# Patient Record
Sex: MALE | Race: WHITE | NOT HISPANIC OR LATINO | Employment: PART TIME | URBAN - NONMETROPOLITAN AREA
[De-identification: names, ages, dates, MRNs, and addresses within clinical notes are randomized per-mention and may not be internally consistent; named-entity substitution may affect disease eponyms.]

---

## 2017-01-26 NOTE — TELEPHONE ENCOUNTER
----- Message from Shaunna Christy sent at 1/26/2017 10:53 AM CST -----  PATIENT WOULD LIKE A REFILL OF METOPROLOL TAR 25 MG TO [facility] IN [place]      RX SENT TO DR LLANOS TO REVIEW

## 2017-04-25 ENCOUNTER — OUTSIDE FACILITY SERVICE (OUTPATIENT)
Dept: CARDIOLOGY | Facility: CLINIC | Age: 77
End: 2017-04-25

## 2017-04-27 ENCOUNTER — OFFICE VISIT (OUTPATIENT)
Dept: CARDIOLOGY | Facility: CLINIC | Age: 77
End: 2017-04-27

## 2017-04-27 VITALS
DIASTOLIC BLOOD PRESSURE: 70 MMHG | SYSTOLIC BLOOD PRESSURE: 122 MMHG | WEIGHT: 144 LBS | BODY MASS INDEX: 23.99 KG/M2 | HEART RATE: 46 BPM | OXYGEN SATURATION: 99 % | HEIGHT: 65 IN

## 2017-04-27 DIAGNOSIS — I47.1 PAROXYSMAL SVT (SUPRAVENTRICULAR TACHYCARDIA) (HCC): ICD-10-CM

## 2017-04-27 DIAGNOSIS — E78.5 DYSLIPIDEMIA: ICD-10-CM

## 2017-04-27 DIAGNOSIS — R06.00 DYSPNEA, UNSPECIFIED TYPE: ICD-10-CM

## 2017-04-27 DIAGNOSIS — I25.10 CORONARY ARTERY DISEASE INVOLVING NATIVE CORONARY ARTERY OF NATIVE HEART WITHOUT ANGINA PECTORIS: Primary | ICD-10-CM

## 2017-04-27 DIAGNOSIS — I10 ESSENTIAL HYPERTENSION: ICD-10-CM

## 2017-04-27 PROBLEM — I47.10 PAROXYSMAL SVT (SUPRAVENTRICULAR TACHYCARDIA): Status: ACTIVE | Noted: 2017-04-27

## 2017-04-27 PROBLEM — N40.0 BPH (BENIGN PROSTATIC HYPERPLASIA): Status: ACTIVE | Noted: 2017-04-27

## 2017-04-27 PROCEDURE — 93227 XTRNL ECG REC<48 HR R&I: CPT | Performed by: INTERNAL MEDICINE

## 2017-04-27 PROCEDURE — 99214 OFFICE O/P EST MOD 30 MIN: CPT | Performed by: INTERNAL MEDICINE

## 2017-04-27 RX ORDER — LORAZEPAM 0.5 MG/1
0.5 TABLET ORAL EVERY 8 HOURS PRN
COMMUNITY

## 2017-04-27 RX ORDER — ATORVASTATIN CALCIUM 40 MG/1
40 TABLET, FILM COATED ORAL NIGHTLY
COMMUNITY

## 2017-04-27 RX ORDER — ASPIRIN 81 MG/1
81 TABLET, CHEWABLE ORAL DAILY
COMMUNITY

## 2017-04-27 RX ORDER — NITROGLYCERIN 0.4 MG/1
0.4 TABLET SUBLINGUAL
Qty: 25 TABLET | Refills: 11 | Status: SHIPPED | OUTPATIENT
Start: 2017-04-27 | End: 2021-09-30 | Stop reason: SDUPTHER

## 2017-04-27 RX ORDER — TAMSULOSIN HYDROCHLORIDE 0.4 MG/1
1 CAPSULE ORAL NIGHTLY
COMMUNITY

## 2017-04-27 RX ORDER — NITROGLYCERIN 0.4 MG/1
0.4 TABLET SUBLINGUAL
COMMUNITY
End: 2017-04-27 | Stop reason: SDUPTHER

## 2017-04-27 NOTE — PROGRESS NOTES
Reason for Visit: cardiovascular follow up.    HPI:  Jose Emerson is a 76 y.o. male is here today for follow-up.  He was recently in the emergency room on 04/25/2017 with dyspnea and weakness.  EKG performed demonstrated sinus rhythm.  Troponin was within normal limits.  He has not had any further symptoms since discharge.  He denies any chest pain, syncope, PND, or orthopnea.      Previous Cardiac Testing and Procedures:  - Echo (03/11/2013) EF 65%, grade 1 diastolic dysfunction, mild AI, normal RV size and function    Patient Active Problem List   Diagnosis   • BPH (benign prostatic hyperplasia)   • Coronary artery disease involving native coronary artery of native heart without angina pectoris   • Paroxysmal SVT (supraventricular tachycardia)       Social History   Substance Use Topics   • Smoking status: Former Smoker     Packs/day: 0.25     Types: Cigarettes     Quit date: 4/27/1957   • Smokeless tobacco: Never Used      Comment: smoked 10-15 years, a pack lasted 2 months   • Alcohol use No       Family History   Problem Relation Age of Onset   • Cancer Father    • Heart disease Father        The following portions of the patient's history were reviewed and updated as appropriate: allergies, current medications, past family history, past medical history, past social history, past surgical history and problem list.      Current Outpatient Prescriptions:   •  aspirin 81 MG chewable tablet, Chew 81 mg Daily., Disp: , Rfl:   •  atorvastatin (LIPITOR) 40 MG tablet, Take 40 mg by mouth Every Night., Disp: , Rfl:   •  LORazepam (ATIVAN) 0.5 MG tablet, Take 0.5 mg by mouth Every 8 (Eight) Hours As Needed for Anxiety., Disp: , Rfl:   •  metoprolol tartrate (LOPRESSOR) 25 MG tablet, Take 1 tablet by mouth 2 (Two) Times a Day., Disp: 60 tablet, Rfl: 11  •  nitroglycerin (NITROSTAT) 0.4 MG SL tablet, Place 0.4 mg under the tongue Every 5 (Five) Minutes As Needed for Chest Pain. Take no more than 3 doses in 15  "minutes., Disp: , Rfl:   •  tamsulosin (FLOMAX) 0.4 MG capsule 24 hr capsule, Take 1 capsule by mouth Every Night., Disp: , Rfl:     Review of Systems   Constitution: Positive for malaise/fatigue. Negative for chills, decreased appetite and fever.   HENT: Negative for congestion, headaches and nosebleeds.    Eyes: Negative for blurred vision and double vision.   Cardiovascular: Positive for irregular heartbeat and leg swelling. Negative for chest pain and palpitations.   Respiratory: Positive for shortness of breath. Negative for cough.    Endocrine: Positive for heat intolerance. Negative for cold intolerance.   Hematologic/Lymphatic: Bruises/bleeds easily.   Skin: Negative for dry skin, itching and rash.   Musculoskeletal: Positive for joint pain and muscle cramps. Negative for back pain and neck pain.   Gastrointestinal: Positive for diarrhea. Negative for abdominal pain, constipation, heartburn and melena.   Genitourinary: Negative for dysuria, frequency and hematuria.   Neurological: Positive for dizziness and light-headedness. Negative for numbness.   Psychiatric/Behavioral: Negative for depression. The patient does not have insomnia and is not nervous/anxious.        Objective   /70 (BP Location: Left arm, Patient Position: Sitting, Cuff Size: Large Adult)  Pulse (!) 46  Ht 65\" (165.1 cm)  Wt 144 lb (65.3 kg)  SpO2 99%  BMI 23.96 kg/m2  Physical Exam   Constitutional: He is oriented to person, place, and time. He appears well-developed and well-nourished.   HENT:   Head: Normocephalic and atraumatic.   Cardiovascular: Normal rate, regular rhythm and normal heart sounds.    No murmur heard.  Pulmonary/Chest: Effort normal. He has wheezes.   Musculoskeletal: He exhibits no edema.   Neurological: He is alert and oriented to person, place, and time.   Skin: Skin is warm and dry.   Psychiatric: He has a normal mood and affect.     Procedures      ICD-10-CM ICD-9-CM   1. Coronary artery disease involving " native coronary artery of native heart without angina pectoris I25.10 414.01   2. Paroxysmal SVT (supraventricular tachycardia) I47.1 427.0   3. Essential hypertension I10 401.9         Assessment/Plan:  1. CAD:  Remote history of stent placement, approximately 10+ years ago.  On medical therapy with aspirin, atorvastatin, metoprolol, and SL nitroglycerin.    2. Paroxysmal SVT: Sinus rhythm on recent EKG.  No evidence of recurrence.  Continue low dose metoprolol.      3. Hypertension: Well controlled today.      4. Dyslipidemia:  Managed on atorvastatin.      5. Dyspnea: Unclear etiology of current symptoms.  Negative work up in the ED.  No recurrence of symptoms.  No evidence of cardiac etiology.

## 2017-10-26 ENCOUNTER — OFFICE VISIT (OUTPATIENT)
Dept: CARDIOLOGY | Facility: CLINIC | Age: 77
End: 2017-10-26

## 2017-10-26 VITALS
SYSTOLIC BLOOD PRESSURE: 114 MMHG | DIASTOLIC BLOOD PRESSURE: 60 MMHG | HEART RATE: 50 BPM | WEIGHT: 146 LBS | OXYGEN SATURATION: 98 % | BODY MASS INDEX: 24.32 KG/M2 | HEIGHT: 65 IN

## 2017-10-26 DIAGNOSIS — R00.1 BRADYCARDIA: ICD-10-CM

## 2017-10-26 DIAGNOSIS — I47.1 PAROXYSMAL SVT (SUPRAVENTRICULAR TACHYCARDIA) (HCC): ICD-10-CM

## 2017-10-26 DIAGNOSIS — E78.5 DYSLIPIDEMIA: ICD-10-CM

## 2017-10-26 DIAGNOSIS — I25.10 CORONARY ARTERY DISEASE INVOLVING NATIVE CORONARY ARTERY OF NATIVE HEART WITHOUT ANGINA PECTORIS: Primary | ICD-10-CM

## 2017-10-26 DIAGNOSIS — I10 ESSENTIAL HYPERTENSION: ICD-10-CM

## 2017-10-26 PROCEDURE — 93000 ELECTROCARDIOGRAM COMPLETE: CPT | Performed by: INTERNAL MEDICINE

## 2017-10-26 PROCEDURE — 99214 OFFICE O/P EST MOD 30 MIN: CPT | Performed by: INTERNAL MEDICINE

## 2017-10-26 NOTE — PROGRESS NOTES
Reason for Visit: cardiovascular follow up.    HPI:  Jose Emerson is a 77 y.o. male is here today for follow-up.  He has been doing about the same.  Feeling tired is his main complaint.  Denies any chest pain, PND, orthopnea, or syncope.  Feels dizzy at times.  Will note occasional skipped beats.  Has pain in his hands that improves when he lays on his hands on his side.  He is active working in a lumbar yard.     Previous Cardiac Testing and Procedures:  - Echo (03/11/2013) EF 65%, grade 1 diastolic dysfunction, mild AI, normal RV size and function  - Holter monitor (04/25/2017) rare ventricular ectopic beats, rare atrial ectopic beats with short runs of nonsustained SVT, no shifting pulse, arrhythmias, or events, no symptoms    Patient Active Problem List   Diagnosis   • BPH (benign prostatic hyperplasia)   • Coronary artery disease involving native coronary artery of native heart without angina pectoris   • Paroxysmal SVT (supraventricular tachycardia)   • Essential hypertension   • Dyslipidemia       Social History   Substance Use Topics   • Smoking status: Former Smoker     Packs/day: 0.25     Types: Cigarettes     Quit date: 4/27/1957   • Smokeless tobacco: Never Used      Comment: smoked 10-15 years, a pack lasted 2 months   • Alcohol use No       Family History   Problem Relation Age of Onset   • Cancer Father    • Heart disease Father        The following portions of the patient's history were reviewed and updated as appropriate: allergies, current medications, past family history, past medical history, past social history, past surgical history and problem list.      Current Outpatient Prescriptions:   •  aspirin 81 MG chewable tablet, Chew 81 mg Daily., Disp: , Rfl:   •  atorvastatin (LIPITOR) 40 MG tablet, Take 40 mg by mouth Every Night., Disp: , Rfl:   •  LORazepam (ATIVAN) 0.5 MG tablet, Take 0.5 mg by mouth Every 8 (Eight) Hours As Needed for Anxiety., Disp: , Rfl:   •  nitroglycerin  "(NITROSTAT) 0.4 MG SL tablet, Place 1 tablet under the tongue Every 5 (Five) Minutes As Needed for Chest Pain. Take no more than 3 doses in 15 minutes., Disp: 25 tablet, Rfl: 11  •  tamsulosin (FLOMAX) 0.4 MG capsule 24 hr capsule, Take 1 capsule by mouth Every Night., Disp: , Rfl:     Review of Systems   Constitution: Positive for malaise/fatigue. Negative for chills, decreased appetite and fever.   HENT: Negative for congestion and nosebleeds.    Eyes: Negative for blurred vision and double vision.   Cardiovascular: Positive for irregular heartbeat and leg swelling. Negative for chest pain and palpitations.   Respiratory: Positive for shortness of breath. Negative for cough.    Endocrine: Positive for heat intolerance. Negative for cold intolerance.   Hematologic/Lymphatic: Bruises/bleeds easily.   Skin: Negative for dry skin, itching and rash.   Musculoskeletal: Positive for joint pain and muscle cramps. Negative for back pain and neck pain.   Gastrointestinal: Positive for diarrhea. Negative for abdominal pain, constipation, heartburn and melena.   Genitourinary: Negative for dysuria, frequency and hematuria.   Neurological: Positive for dizziness and light-headedness. Negative for headaches and numbness.   Psychiatric/Behavioral: Negative for depression. The patient does not have insomnia and is not nervous/anxious.        Objective   /60 (BP Location: Left arm, Patient Position: Sitting, Cuff Size: Adult)  Pulse 50  Ht 65\" (165.1 cm)  Wt 146 lb (66.2 kg)  SpO2 98%  BMI 24.3 kg/m2  Physical Exam   Constitutional: He is oriented to person, place, and time. He appears well-developed and well-nourished.   HENT:   Head: Normocephalic and atraumatic.   Cardiovascular: Normal rate, regular rhythm and normal heart sounds.    No murmur heard.  Pulmonary/Chest: Effort normal and breath sounds normal.   Musculoskeletal: He exhibits no edema.   Neurological: He is alert and oriented to person, place, and time. "   Skin: Skin is warm and dry.   Psychiatric: He has a normal mood and affect.       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 Coronary artery disease involving native coronary artery of native heart without angina pectoris I25.10 414.01   2. Paroxysmal SVT (supraventricular tachycardia) I47.1 427.0   3. Essential hypertension I10 401.9   4. Dyslipidemia E78.5 272.4   5. Bradycardia R00.1 427.89         Assessment/Plan:  1.  CAD:  Remote history of stent placement, approximately > 10 years ago.  On medical therapy with aspirin, atorvastatin, metoprolol, and SL nitroglycerin.     2. Paroxysmal SVT: Short runs of nonsustained SVT on recent Holter monitor.  Sinus bradycardia on EKG today.   Discontinue metoprolol due to bradycardia.       3. Hypertension: Pressure remains well controlled.     4. Dyslipidemia:   Continue atorvastatin.  Cholesterol followed by Dr Moreira.       5. Bradycardia.  Likely secondary to metoprolol.  Will discontinue.

## 2018-05-10 ENCOUNTER — OFFICE VISIT (OUTPATIENT)
Dept: CARDIOLOGY | Facility: CLINIC | Age: 78
End: 2018-05-10

## 2018-05-10 VITALS
HEART RATE: 61 BPM | SYSTOLIC BLOOD PRESSURE: 126 MMHG | HEIGHT: 65 IN | OXYGEN SATURATION: 97 % | DIASTOLIC BLOOD PRESSURE: 60 MMHG | BODY MASS INDEX: 23.66 KG/M2 | RESPIRATION RATE: 14 BRPM | WEIGHT: 142 LBS

## 2018-05-10 DIAGNOSIS — I47.1 PAROXYSMAL SVT (SUPRAVENTRICULAR TACHYCARDIA) (HCC): ICD-10-CM

## 2018-05-10 DIAGNOSIS — I10 ESSENTIAL HYPERTENSION: ICD-10-CM

## 2018-05-10 DIAGNOSIS — E78.5 DYSLIPIDEMIA: ICD-10-CM

## 2018-05-10 DIAGNOSIS — I25.10 CORONARY ARTERY DISEASE INVOLVING NATIVE CORONARY ARTERY OF NATIVE HEART WITHOUT ANGINA PECTORIS: Primary | ICD-10-CM

## 2018-05-10 PROCEDURE — 99214 OFFICE O/P EST MOD 30 MIN: CPT | Performed by: INTERNAL MEDICINE

## 2018-05-10 PROCEDURE — 93000 ELECTROCARDIOGRAM COMPLETE: CPT | Performed by: INTERNAL MEDICINE

## 2018-05-10 RX ORDER — HYDROCODONE BITARTRATE AND ACETAMINOPHEN 7.5; 325 MG/1; MG/1
1 TABLET ORAL EVERY 8 HOURS PRN
COMMUNITY
Start: 2018-03-15

## 2018-07-09 ENCOUNTER — TELEPHONE (OUTPATIENT)
Dept: CARDIOLOGY | Facility: CLINIC | Age: 78
End: 2018-07-09

## 2018-07-09 NOTE — TELEPHONE ENCOUNTER
I agree.  There is no cardiac reason that I can see that would make him unable to serve jury duty.

## 2018-07-09 NOTE — TELEPHONE ENCOUNTER
Pt needs letter stating that he can't participate in jury duty due to health conditions.  He said that he has to turn it in by tomorrow.

## 2018-07-09 NOTE — TELEPHONE ENCOUNTER
Please notify pt of Dr. Vail's recommendations. He needs to notify us if he has a cardiac reason as to why he would be unable to participate in jury duty. [place]!

## 2018-07-09 NOTE — TELEPHONE ENCOUNTER
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

## 2018-07-10 NOTE — TELEPHONE ENCOUNTER
CALLED PT INFORMED HIM THAT PER DR LLANOS THAT UNLESS HE IS HAVING CARDIAC PROBLEMS THERE IS NO REASON HE COULD NOT SERVE ON JURY DUTY PT VOICED UNDERSTANDING

## 2019-05-09 ENCOUNTER — OFFICE VISIT (OUTPATIENT)
Dept: CARDIOLOGY | Facility: CLINIC | Age: 79
End: 2019-05-09

## 2019-05-09 VITALS
BODY MASS INDEX: 21.66 KG/M2 | HEIGHT: 65 IN | HEART RATE: 65 BPM | WEIGHT: 130 LBS | SYSTOLIC BLOOD PRESSURE: 122 MMHG | DIASTOLIC BLOOD PRESSURE: 58 MMHG | OXYGEN SATURATION: 98 %

## 2019-05-09 DIAGNOSIS — I10 ESSENTIAL HYPERTENSION: ICD-10-CM

## 2019-05-09 DIAGNOSIS — I25.10 CORONARY ARTERY DISEASE INVOLVING NATIVE CORONARY ARTERY OF NATIVE HEART WITHOUT ANGINA PECTORIS: Primary | ICD-10-CM

## 2019-05-09 DIAGNOSIS — E78.5 DYSLIPIDEMIA: ICD-10-CM

## 2019-05-09 DIAGNOSIS — I47.1 PAROXYSMAL SVT (SUPRAVENTRICULAR TACHYCARDIA) (HCC): ICD-10-CM

## 2019-05-09 PROCEDURE — 99214 OFFICE O/P EST MOD 30 MIN: CPT | Performed by: INTERNAL MEDICINE

## 2019-05-09 PROCEDURE — 93000 ELECTROCARDIOGRAM COMPLETE: CPT | Performed by: INTERNAL MEDICINE

## 2019-05-09 NOTE — PROGRESS NOTES
Reason for Visit: cardiovascular follow up.    HPI:  Jose Emerson is a 78 y.o. male is here today for 1 year follow-up.  He is planning on retiring later this month.  He has to spend a lot of time helping to taking of his wife who has back and other health problems.  He denies any chest pain, palpitations, dizziness, syncope, PND, or orthopnea.  He just notes occasional irregular heart beats.  He is active and has no problems with heavy exertion.  His blood pressure has been well controlled.      Previous Cardiac Testing and Procedures:  - Echo (2013) EF 65%, grade 1 diastolic dysfunction, mild AI, normal RV size and function  - Holter monitor (2017) rare ventricular ectopic beats, rare atrial ectopic beats with short runs of nonsustained SVT, no shifting pulse, arrhythmias, or events, no symptoms    Patient Active Problem List   Diagnosis   • BPH (benign prostatic hyperplasia)   • Coronary artery disease involving native coronary artery of native heart without angina pectoris   • Paroxysmal SVT (supraventricular tachycardia) (CMS/HCC)   • Essential hypertension   • Dyslipidemia       Social History     Tobacco Use   • Smoking status: Former Smoker     Packs/day: 0.25     Types: Cigarettes     Last attempt to quit: 1957     Years since quittin.0   • Smokeless tobacco: Never Used   • Tobacco comment: smoked 10-15 years, a pack lasted 2 months   Substance Use Topics   • Alcohol use: No   • Drug use: No       Family History   Problem Relation Age of Onset   • Cancer Father    • Heart disease Father        The following portions of the patient's history were reviewed and updated as appropriate: allergies, current medications, past family history, past medical history, past social history, past surgical history and problem list.      Current Outpatient Medications:   •  aspirin 81 MG chewable tablet, Chew 81 mg Daily., Disp: , Rfl:   •  atorvastatin (LIPITOR) 40 MG tablet, Take 40 mg by mouth  "Every Night., Disp: , Rfl:   •  HYDROcodone-acetaminophen (NORCO) 7.5-325 MG per tablet, Take 1 tablet by mouth Every 8 (Eight) Hours As Needed., Disp: , Rfl:   •  LORazepam (ATIVAN) 0.5 MG tablet, Take 0.5 mg by mouth Every 8 (Eight) Hours As Needed for Anxiety., Disp: , Rfl:   •  nitroglycerin (NITROSTAT) 0.4 MG SL tablet, Place 1 tablet under the tongue Every 5 (Five) Minutes As Needed for Chest Pain. Take no more than 3 doses in 15 minutes., Disp: 25 tablet, Rfl: 11  •  tamsulosin (FLOMAX) 0.4 MG capsule 24 hr capsule, Take 1 capsule by mouth Every Night., Disp: , Rfl:     Review of Systems   Constitution: Negative for chills and fever.   Cardiovascular: Negative for chest pain and paroxysmal nocturnal dyspnea.   Respiratory: Negative for cough and shortness of breath.    Skin: Negative for rash.   Gastrointestinal: Negative for abdominal pain and heartburn.   Neurological: Negative for dizziness and numbness.       Objective   /58 (BP Location: Left arm, Patient Position: Sitting, Cuff Size: Adult)   Pulse 65   Ht 165.1 cm (65\")   Wt 59 kg (130 lb)   SpO2 98%   BMI 21.63 kg/m²   Physical Exam   Constitutional: He is oriented to person, place, and time. He appears well-developed and well-nourished.   HENT:   Head: Normocephalic and atraumatic.   Cardiovascular: Normal rate, regular rhythm and normal heart sounds.   No murmur heard.  Pulmonary/Chest: Effort normal and breath sounds normal.   Musculoskeletal: He exhibits no edema.   Neurological: He is alert and oriented to person, place, and time.   Skin: Skin is warm and dry.   Psychiatric: He has a normal mood and affect.       ECG 12 Lead  Date/Time: 5/9/2019 9:24 AM  Performed by: Fred Vail MD  Authorized by: Fred Vail MD   Comparison: compared with previous ECG from 5/10/2018  Similar to previous ECG  Rhythm: sinus rhythm  Rate: normal  Conduction: left anterior fascicular block              ICD-10-CM ICD-9-CM   1. Coronary artery " disease involving native coronary artery of native heart without angina pectoris I25.10 414.01   2. Paroxysmal SVT (supraventricular tachycardia) (CMS/[facility]) I47.1 427.0   3. Essential hypertension I10 401.9   4. Dyslipidemia E78.5 272.4         Assessment/Plan:  1. CAD:  Remote history of stent placement, > 10 years ago.  Remains chest pain-free.  Continue aspirin and atorvastatin.     2. Paroxysmal SVT: Short runs of nonsustained SVT on Holter monitor from 4/2017.   No significant palpitations or symptoms.       3. Hypertension: Blood pressure remains well-controlled.      4. Dyslipidemia:   Managed on atorvastatin.  Will obtain copy of last lipid panel.

## 2020-08-26 ENCOUNTER — OFFICE VISIT (OUTPATIENT)
Dept: CARDIOLOGY | Facility: CLINIC | Age: 80
End: 2020-08-26

## 2020-08-26 VITALS
WEIGHT: 128 LBS | BODY MASS INDEX: 21.33 KG/M2 | HEIGHT: 65 IN | SYSTOLIC BLOOD PRESSURE: 120 MMHG | OXYGEN SATURATION: 97 % | HEART RATE: 58 BPM | DIASTOLIC BLOOD PRESSURE: 62 MMHG

## 2020-08-26 DIAGNOSIS — I47.1 PAROXYSMAL SVT (SUPRAVENTRICULAR TACHYCARDIA) (HCC): ICD-10-CM

## 2020-08-26 DIAGNOSIS — I10 ESSENTIAL HYPERTENSION: ICD-10-CM

## 2020-08-26 DIAGNOSIS — E78.5 DYSLIPIDEMIA: ICD-10-CM

## 2020-08-26 DIAGNOSIS — I25.10 CORONARY ARTERY DISEASE INVOLVING NATIVE CORONARY ARTERY OF NATIVE HEART WITHOUT ANGINA PECTORIS: Primary | ICD-10-CM

## 2020-08-26 PROCEDURE — 99214 OFFICE O/P EST MOD 30 MIN: CPT | Performed by: INTERNAL MEDICINE

## 2020-08-26 RX ORDER — MIRTAZAPINE 15 MG/1
15 TABLET, ORALLY DISINTEGRATING ORAL NIGHTLY
COMMUNITY

## 2020-08-26 NOTE — PROGRESS NOTES
Reason for Visit: cardiovascular follow up.    HPI:  Jose Emerson is a 80 y.o. male is here today for follow-up.  He has been doing well from a cardiac standpoint.  He continues to lose weight and is not sure why.  He is down 2 lbs compared to last office visit from 2019.  He has been undergoing work up for this with Dr Moreira.   He denies any chest pain, palpitations, dizziness, syncope, PND, or orthopnea.  He has an occasional skipped beat but does not bother him much.      Previous Cardiac Testing and Procedures:  - Echo (2013) EF 65%, grade 1 diastolic dysfunction, mild AI, normal RV size and function  - Holter monitor (2017) rare ventricular ectopic beats, rare atrial ectopic beats with short runs of nonsustained SVT, no shifting pulse, arrhythmias, or events, no symptoms  - Lipid panel (12/10/2018) total cholesterol 126, HDL 62, LDL 56, triglycerides 40    Patient Active Problem List   Diagnosis   • BPH (benign prostatic hyperplasia)   • Coronary artery disease involving native coronary artery of native heart without angina pectoris   • Paroxysmal SVT (supraventricular tachycardia) (CMS/[facility])   • Essential hypertension   • Dyslipidemia       Social History     Tobacco Use   • Smoking status: Former Smoker     Packs/day: 0.25     Types: Cigarettes     Last attempt to quit: 1957     Years since quittin.3   • Smokeless tobacco: Never Used   • Tobacco comment: smoked 10-15 years, a pack lasted 2 months   Substance Use Topics   • Alcohol use: No   • Drug use: No       Family History   Problem Relation Age of Onset   • Cancer Father    • Heart disease Father        The following portions of the patient's history were reviewed and updated as appropriate: allergies, current medications, past family history, past medical history, past social history, past surgical history and problem list.      Current Outpatient Medications:   •  aspirin 81 MG chewable tablet, Chew 81 mg Daily., Disp: , Rfl:  "  •  atorvastatin (LIPITOR) 40 MG tablet, Take 40 mg by mouth Every Night., Disp: , Rfl:   •  HYDROcodone-acetaminophen (NORCO) 7.5-325 MG per tablet, Take 1 tablet by mouth Every 8 (Eight) Hours As Needed., Disp: , Rfl:   •  LORazepam (ATIVAN) 0.5 MG tablet, Take 0.5 mg by mouth Every 8 (Eight) Hours As Needed for Anxiety., Disp: , Rfl:   •  mirtazapine (REMERON SOL-TAB) 15 MG disintegrating tablet, Place 15 mg on the tongue Every Night., Disp: , Rfl:   •  tamsulosin (FLOMAX) 0.4 MG capsule 24 hr capsule, Take 1 capsule by mouth Every Night., Disp: , Rfl:   •  nitroglycerin (NITROSTAT) 0.4 MG SL tablet, Place 1 tablet under the tongue Every 5 (Five) Minutes As Needed for Chest Pain. Take no more than 3 doses in 15 minutes., Disp: 25 tablet, Rfl: 11    Review of Systems   Constitution: Positive for weight loss. Negative for chills and fever.   Cardiovascular: Positive for dyspnea on exertion and irregular heartbeat. Negative for chest pain, palpitations and paroxysmal nocturnal dyspnea.   Respiratory: Positive for shortness of breath. Negative for cough.    Skin: Negative for rash.   Gastrointestinal: Negative for abdominal pain and heartburn.   Neurological: Negative for dizziness and numbness.       Objective   /62 (BP Location: Left arm, Patient Position: Sitting, Cuff Size: Adult)   Pulse 58   Ht 165.1 cm (65\")   Wt 58.1 kg (128 lb)   SpO2 97%   BMI 21.30 kg/m²   Physical Exam   Constitutional: He is oriented to person, place, and time.   Thin appearing   HENT:   Head: Normocephalic and atraumatic.   Cardiovascular: Normal rate, regular rhythm and normal heart sounds.   No murmur heard.  Pulmonary/Chest: Effort normal and breath sounds normal.   Musculoskeletal: He exhibits no edema.   Neurological: He is alert and oriented to person, place, and time.   Skin: Skin is warm and dry.   Psychiatric: He has a normal mood and affect.     Procedures      ICD-10-CM ICD-9-CM   1. Coronary artery disease " involving native coronary artery of native heart without angina pectoris I25.10 414.01   2. Paroxysmal SVT (supraventricular tachycardia) (CMS/[facility]) I47.1 427.0   3. Essential hypertension I10 401.9   4. Dyslipidemia E78.5 272.4         Assessment/Plan:  1.  Coronary artery disease: History of remote history of stent placement.  He remains asymptomatic.  Continue therapy with aspirin and atorvastatin.     2. Paroxysmal SVT: Short runs of nonsustained SVT seen on previous Holter monitor from 4/2017.  He remains asymptomatic.  Continue to monitor.     3.  Essential hypertension: Blood pressure remains well controlled on current therapy.     4.  Dyslipidemia: Well-controlled on atorvastatin based on lipid panel from 2018.

## 2021-09-30 ENCOUNTER — OFFICE VISIT (OUTPATIENT)
Dept: CARDIOLOGY | Facility: CLINIC | Age: 81
End: 2021-09-30

## 2021-09-30 VITALS
OXYGEN SATURATION: 97 % | SYSTOLIC BLOOD PRESSURE: 132 MMHG | DIASTOLIC BLOOD PRESSURE: 64 MMHG | WEIGHT: 125 LBS | BODY MASS INDEX: 19.62 KG/M2 | HEIGHT: 67 IN | HEART RATE: 53 BPM

## 2021-09-30 DIAGNOSIS — E78.5 DYSLIPIDEMIA: ICD-10-CM

## 2021-09-30 DIAGNOSIS — M79.89 LEFT LEG SWELLING: ICD-10-CM

## 2021-09-30 DIAGNOSIS — I47.1 PAROXYSMAL SVT (SUPRAVENTRICULAR TACHYCARDIA) (HCC): Primary | ICD-10-CM

## 2021-09-30 DIAGNOSIS — R00.2 PALPITATIONS: ICD-10-CM

## 2021-09-30 DIAGNOSIS — I10 ESSENTIAL HYPERTENSION: ICD-10-CM

## 2021-09-30 DIAGNOSIS — I25.10 CORONARY ARTERY DISEASE INVOLVING NATIVE CORONARY ARTERY OF NATIVE HEART WITHOUT ANGINA PECTORIS: ICD-10-CM

## 2021-09-30 PROCEDURE — 99214 OFFICE O/P EST MOD 30 MIN: CPT | Performed by: INTERNAL MEDICINE

## 2021-09-30 PROCEDURE — 93000 ELECTROCARDIOGRAM COMPLETE: CPT | Performed by: INTERNAL MEDICINE

## 2021-09-30 RX ORDER — NITROGLYCERIN 0.4 MG/1
0.4 TABLET SUBLINGUAL
Qty: 25 TABLET | Refills: 11 | Status: SHIPPED | OUTPATIENT
Start: 2021-09-30 | End: 2022-09-21 | Stop reason: SDUPTHER

## 2021-09-30 NOTE — PROGRESS NOTES
Reason for Visit: cardiovascular follow up.    HPI:  Jose Emerson is a 81 y.o. male is here today for 1 year follow-up.  He is doing well for the most part.  He has intermittent palpitations that sometimes scare him.  This started a couple of months ago.  He denies any chest pain, dizziness, syncope, PND, or orthopnea.  He doesn't check his blood pressure much at home.  He stays active but does not get any formal exercise.  He has some swelling in his left leg.  It happens when his leg is down and goes away relatively easily when he elevates it.      Previous Cardiac Testing and Procedures:  - Echo (2013) EF 65%, grade 1 diastolic dysfunction, mild AI, normal RV size and function  - Holter monitor (2017) rare ventricular ectopic beats, rare atrial ectopic beats with short runs of nonsustained SVT, no shifting pulse, arrhythmias, or events, no symptoms  - Lipid panel (12/10/2018) total cholesterol 126, HDL 62, LDL 56, triglycerides 40  - Lipid panel (2021) total cholesterol 133, HDL 67, LDL 57, triglycerides 45    Patient Active Problem List   Diagnosis   • BPH (benign prostatic hyperplasia)   • Coronary artery disease involving native coronary artery of native heart without angina pectoris   • Paroxysmal SVT (supraventricular tachycardia) (CMS/HCC)   • Essential hypertension   • Dyslipidemia       Social History     Tobacco Use   • Smoking status: Former Smoker     Packs/day: 0.25     Types: Cigarettes     Quit date: 1957     Years since quittin.4   • Smokeless tobacco: Never Used   • Tobacco comment: smoked 10-15 years, a pack lasted 2 months   Substance Use Topics   • Alcohol use: No   • Drug use: No       Family History   Problem Relation Age of Onset   • Cancer Father    • Heart disease Father        The following portions of the patient's history were reviewed and updated as appropriate: allergies, current medications, past family history, past medical history, past social  "history, past surgical history and problem list.      Current Outpatient Medications:   •  aspirin 81 MG chewable tablet, Chew 81 mg Daily., Disp: , Rfl:   •  atorvastatin (LIPITOR) 40 MG tablet, Take 40 mg by mouth Every Night., Disp: , Rfl:   •  HYDROcodone-acetaminophen (NORCO) 7.5-325 MG per tablet, Take 1 tablet by mouth Every 8 (Eight) Hours As Needed., Disp: , Rfl:   •  LORazepam (ATIVAN) 0.5 MG tablet, Take 0.5 mg by mouth Every 8 (Eight) Hours As Needed for Anxiety., Disp: , Rfl:   •  mirtazapine (REMERON SOL-TAB) 15 MG disintegrating tablet, Place 15 mg on the tongue Every Night., Disp: , Rfl:   •  nitroglycerin (NITROSTAT) 0.4 MG SL tablet, Place 1 tablet under the tongue Every 5 (Five) Minutes As Needed for Chest Pain. Take no more than 3 doses in 15 minutes., Disp: 25 tablet, Rfl: 11  •  tamsulosin (FLOMAX) 0.4 MG capsule 24 hr capsule, Take 1 capsule by mouth Every Night., Disp: , Rfl:     Review of Systems   Constitutional: Negative for chills and fever.   Cardiovascular: Positive for irregular heartbeat, leg swelling (left) and palpitations. Negative for chest pain and paroxysmal nocturnal dyspnea.   Respiratory: Negative for cough and shortness of breath.    Skin: Negative for rash.   Gastrointestinal: Negative for abdominal pain and heartburn.   Neurological: Negative for dizziness and numbness.       Objective   /64 (BP Location: Left arm, Patient Position: Sitting, Cuff Size: Adult)   Pulse 53   Ht 170.2 cm (67\")   Wt 56.7 kg (125 lb)   SpO2 97%   BMI 19.58 kg/m²   Constitutional:       Appearance: Well-developed.   HENT:      Head: Normocephalic and atraumatic.   Pulmonary:      Effort: Pulmonary effort is normal.      Breath sounds: Normal breath sounds.   Cardiovascular:      Normal rate. Regular rhythm.      Murmurs: There is no murmur.      No gallop. No click.   Edema:     Peripheral edema absent.   Skin:     General: Skin is warm and dry.   Neurological:      Mental Status: " Alert and oriented to person, place, and time.         ECG 12 Lead    Date/Time: 9/30/2021 11:13 AM  Performed by: Fred Vail MD  Authorized by: Fred Vail MD   Comparison: compared with previous ECG from 5/9/2019  Comparison to previous ECG: RBBB is now present  Rhythm: sinus bradycardia  Conduction: right bundle branch block and left anterior fascicular block              ICD-10-CM ICD-9-CM   1. Paroxysmal SVT (supraventricular tachycardia) (CMS/[facility])  I47.1 427.0   2. Coronary artery disease involving native coronary artery of native heart without angina pectoris  I25.10 414.01   3. Essential hypertension  I10 401.9   4. Dyslipidemia  E78.5 272.4   5. Palpitations  R00.2 785.1   6. Left leg swelling  M79.89 729.81         Assessment/Plan:  1. Coronary artery disease: History of remote PCI.  He remains chest pain-free.  Continue aspirin and atorvastatin.      2. Paroxysmal SVT: Short runs of nonsustained SVT seen on Holter monitor from 4/2017.  He has been having palpitations over the past couple of months.  Will check a Holter monitor.       3.  Essential hypertension: Acceptable blood pressure control without any antihypertensive therapy.     4.  Dyslipidemia:  Lipid panel from 9/20/2021 shows good control.  Continue atorvastatin.    5.  Palpitations: Unclear etiology.  Will evaluate further with a Holter monitor.      6.  Left lower extremity swelling: Most likely due to venous insufficiency.  Since it is unilateral, will check an US.

## 2021-10-11 ENCOUNTER — TELEPHONE (OUTPATIENT)
Dept: CARDIOLOGY | Facility: CLINIC | Age: 81
End: 2021-10-11

## 2021-10-11 NOTE — TELEPHONE ENCOUNTER
----- Message from Fred Vail MD sent at 10/11/2021  3:30 PM CDT -----  I called the patient and discussed results of his Holter monitor.  He did not report any symptoms during it.  There is a few times where his heart would speed up for a few seconds but nothing that is particularly dangerous or worrisome.

## 2021-10-21 ENCOUNTER — TELEPHONE (OUTPATIENT)
Dept: CARDIOLOGY | Facility: CLINIC | Age: 81
End: 2021-10-21

## 2021-10-21 NOTE — TELEPHONE ENCOUNTER
----- Message from Fred Vail MD sent at 10/19/2021  4:35 PM CDT -----  Please let him know that the ultrasound of his legs shows no evidence of blood clots.

## 2022-04-11 ENCOUNTER — OFFICE VISIT (OUTPATIENT)
Dept: OTOLARYNGOLOGY | Facility: CLINIC | Age: 82
End: 2022-04-11

## 2022-04-11 VITALS
HEIGHT: 65 IN | WEIGHT: 118 LBS | TEMPERATURE: 98 F | HEART RATE: 82 BPM | RESPIRATION RATE: 20 BRPM | DIASTOLIC BLOOD PRESSURE: 79 MMHG | BODY MASS INDEX: 19.66 KG/M2 | SYSTOLIC BLOOD PRESSURE: 145 MMHG

## 2022-04-11 DIAGNOSIS — L82.0 INFLAMED SEBORRHEIC KERATOSIS: ICD-10-CM

## 2022-04-11 DIAGNOSIS — D48.5 NEOPLASM OF UNCERTAIN BEHAVIOR OF SKIN: Primary | ICD-10-CM

## 2022-04-11 PROCEDURE — 99204 OFFICE O/P NEW MOD 45 MIN: CPT | Performed by: OTOLARYNGOLOGY

## 2022-04-11 PROCEDURE — 11105 PUNCH BX SKIN EA SEP/ADDL: CPT | Performed by: OTOLARYNGOLOGY

## 2022-04-11 PROCEDURE — 11103 TANGNTL BX SKIN EA SEP/ADDL: CPT | Performed by: OTOLARYNGOLOGY

## 2022-04-11 PROCEDURE — 88305 TISSUE EXAM BY PATHOLOGIST: CPT | Performed by: OTOLARYNGOLOGY

## 2022-04-11 PROCEDURE — 11104 PUNCH BX SKIN SINGLE LESION: CPT | Performed by: OTOLARYNGOLOGY

## 2022-04-11 RX ORDER — MONTELUKAST SODIUM 4 MG/1
TABLET, CHEWABLE ORAL
COMMUNITY
End: 2023-03-29

## 2022-04-11 RX ORDER — POTASSIUM CHLORIDE 750 MG/1
TABLET, EXTENDED RELEASE ORAL
COMMUNITY
End: 2023-03-29

## 2022-04-11 RX ORDER — POTASSIUM CHLORIDE 750 MG/1
10 TABLET, EXTENDED RELEASE ORAL DAILY
COMMUNITY
Start: 2022-03-21 | End: 2022-09-21 | Stop reason: SDUPTHER

## 2022-04-11 NOTE — PROGRESS NOTES
Preprocedure diagnosis: Inflamed seborrheic keratosis of the left lower eyelid    Post procedure diagnosis: Same    Procedure: Shave biopsy    Details:  Patient consent was obtained.  The skin was cleansed with alcohol.  The skin was infiltrated with 1 mL of 1% lidocaine with 1-100,000 epinephrine.  After approximately 10 minutes, a Dermablade was used to perform the shave biopsy. The specimen was lifted off the defect and sent in formalin for permanent section pathology. Pressure was applied to the wound,followed by silver nitrate. A Band-Aid was placed over the biopsy site.  The patient tolerated the procedure with minimal discomfort.      Carlos Dial MD  04/11/22  14:02 CDT    Preprocedure diagnosis: Inflamed seborrheic keratosis of the left cheek    Post procedure diagnosis: Same    Procedure: Shave biopsy    Details:  Patient consent was obtained.  The skin was cleansed with alcohol.  The skin was infiltrated with 1 mL of 1% lidocaine with 1-100,000 epinephrine.  After approximately 10 minutes, a Dermablade was used to perform the shave biopsy. The specimen was lifted off the defect and sent in formalin for permanent section pathology. Pressure was applied to the wound,followed by silver nitrate. A Band-Aid was placed over the biopsy site.  The patient tolerated the procedure with minimal discomfort.      Carlos Dial MD  04/11/22  14:03 CDT
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
## 2022-04-14 LAB
LAB AP CASE REPORT: NORMAL
LAB AP CLINICAL INFORMATION: NORMAL
LAB AP DIAGNOSIS COMMENT: NORMAL
PATH REPORT.FINAL DX SPEC: NORMAL
PATH REPORT.GROSS SPEC: NORMAL

## 2022-04-15 ENCOUNTER — TELEPHONE (OUTPATIENT)
Dept: OTOLARYNGOLOGY | Facility: CLINIC | Age: 82
End: 2022-04-15

## 2022-09-21 ENCOUNTER — OFFICE VISIT (OUTPATIENT)
Dept: CARDIOLOGY | Facility: CLINIC | Age: 82
End: 2022-09-21

## 2022-09-21 VITALS
HEIGHT: 65 IN | WEIGHT: 114 LBS | SYSTOLIC BLOOD PRESSURE: 138 MMHG | DIASTOLIC BLOOD PRESSURE: 82 MMHG | BODY MASS INDEX: 18.99 KG/M2 | OXYGEN SATURATION: 98 % | HEART RATE: 52 BPM

## 2022-09-21 DIAGNOSIS — I47.1 PAROXYSMAL SVT (SUPRAVENTRICULAR TACHYCARDIA): Primary | ICD-10-CM

## 2022-09-21 DIAGNOSIS — I25.10 CORONARY ARTERY DISEASE INVOLVING NATIVE CORONARY ARTERY OF NATIVE HEART WITHOUT ANGINA PECTORIS: ICD-10-CM

## 2022-09-21 DIAGNOSIS — R60.0 EDEMA OF LEFT LOWER EXTREMITY: ICD-10-CM

## 2022-09-21 DIAGNOSIS — I10 ESSENTIAL HYPERTENSION: ICD-10-CM

## 2022-09-21 DIAGNOSIS — E78.5 DYSLIPIDEMIA: ICD-10-CM

## 2022-09-21 DIAGNOSIS — R00.2 PALPITATIONS: ICD-10-CM

## 2022-09-21 PROCEDURE — 99214 OFFICE O/P EST MOD 30 MIN: CPT | Performed by: INTERNAL MEDICINE

## 2022-09-21 PROCEDURE — 93000 ELECTROCARDIOGRAM COMPLETE: CPT | Performed by: INTERNAL MEDICINE

## 2022-09-21 RX ORDER — NITROGLYCERIN 0.4 MG/1
0.4 TABLET SUBLINGUAL
Qty: 25 TABLET | Refills: 11 | Status: SHIPPED | OUTPATIENT
Start: 2022-09-21

## 2022-09-21 NOTE — PROGRESS NOTES
"  Reason for Visit: cardiovascular follow up.    HPI:  Jose Emerson is a 82 y.o. male is here today for 1 year follow-up.  He gets dizzy when he first gets up in the morning.  He also notes some dizziness when he changes positions.  He denies any chest pain, syncope, PND, or orthopnea.  He reports a \"double heart beat\" at times, but it does not bother him much.  He drinks a lot of water and stays hydrated.      Previous Cardiac Testing and Procedures:  - Echo (2013) EF 65%, grade 1 diastolic dysfunction, mild AI, normal RV size and function  - Holter monitor (2017) rare ventricular ectopic beats, rare atrial ectopic beats with short runs of nonsustained SVT, no shifting pulse, arrhythmias, or events, no symptoms  - Lipid panel (12/10/2018) total cholesterol 126, HDL 62, LDL 56, triglycerides 40  - Lipid panel (2021) total cholesterol 133, HDL 67, LDL 57, triglycerides 45  - Lipid panel (3/18/2022) total cholesterol 149, HDL 68, LDL 69, triglycerides 59  - Lower extremity Doppler (10/13/2021) no evidence of DVT  - Holter monitor (2021) rare PACs and PVCs, runs of nonsustained SVT up to 15 beats, no symptoms reported    Patient Active Problem List   Diagnosis   • BPH (benign prostatic hyperplasia)   • Coronary artery disease involving native coronary artery of native heart without angina pectoris   • Paroxysmal SVT (supraventricular tachycardia) (HCC)   • Essential hypertension   • Dyslipidemia       Social History     Tobacco Use   • Smoking status: Former Smoker     Packs/day: 0.25     Types: Cigarettes     Quit date: 1957     Years since quittin.4   • Smokeless tobacco: Never Used   • Tobacco comment: smoked 10-15 years, a pack lasted 2 months   Vaping Use   • Vaping Use: Never used   Substance Use Topics   • Alcohol use: No   • Drug use: No       Family History   Problem Relation Age of Onset   • Cancer Father    • Heart disease Father        The following portions of the " "patient's history were reviewed and updated as appropriate: allergies, current medications, past family history, past medical history, past social history, past surgical history and problem list.      Current Outpatient Medications:   •  aspirin 81 MG chewable tablet, Chew 81 mg Daily., Disp: , Rfl:   •  atorvastatin (LIPITOR) 40 MG tablet, Take 40 mg by mouth Every Night., Disp: , Rfl:   •  colestipol (COLESTID) 1 g tablet, colestipol 1 gram tablet  Take 2 tablets every day by oral route., Disp: , Rfl:   •  HYDROcodone-acetaminophen (NORCO) 7.5-325 MG per tablet, Take 1 tablet by mouth Every 8 (Eight) Hours As Needed., Disp: , Rfl:   •  LORazepam (ATIVAN) 0.5 MG tablet, Take 0.5 mg by mouth Every 8 (Eight) Hours As Needed for Anxiety., Disp: , Rfl:   •  mirtazapine (REMERON SOL-TAB) 15 MG disintegrating tablet, Place 15 mg on the tongue Every Night., Disp: , Rfl:   •  nitroglycerin (NITROSTAT) 0.4 MG SL tablet, Place 1 tablet under the tongue Every 5 (Five) Minutes As Needed for Chest Pain. Take no more than 3 doses in 15 minutes., Disp: 25 tablet, Rfl: 11  •  potassium chloride (K-DUR,KLOR-CON) 10 MEQ CR tablet, potassium chloride ER 10 mEq tablet,extended release(part/cryst)  TAKE 1 TABLET BY MOUTH ONCE DAILY, Disp: , Rfl:   •  tamsulosin (FLOMAX) 0.4 MG capsule 24 hr capsule, Take 1 capsule by mouth Every Night., Disp: , Rfl:     Review of Systems   Constitutional: Negative for chills and fever.   HENT: Positive for hearing loss.    Cardiovascular: Positive for irregular heartbeat and leg swelling. Negative for chest pain and paroxysmal nocturnal dyspnea.   Respiratory: Negative for cough and shortness of breath.    Skin: Negative for rash.   Gastrointestinal: Negative for abdominal pain and heartburn.   Neurological: Negative for dizziness and numbness.       Objective   /82 (BP Location: Left arm, Patient Position: Sitting, Cuff Size: Adult)   Pulse 52   Ht 165.1 cm (65\")   Wt 51.7 kg (114 lb)   SpO2 " 98%   BMI 18.97 kg/m²   Constitutional:       Appearance: Well-developed and normal weight.   HENT:      Head: Normocephalic and atraumatic.      Right Ear: Decreased hearing noted.      Left Ear: Decreased hearing noted.   Pulmonary:      Effort: Pulmonary effort is normal.      Breath sounds: Normal breath sounds.   Cardiovascular:      Normal rate. Regular rhythm.      Murmurs: There is no murmur.      No gallop. No click.   Edema:     Pretibial: trace edema of the left pretibial area.  Skin:     General: Skin is warm and dry.   Neurological:      Mental Status: Alert and oriented to person, place, and time.         ECG 12 Lead    Date/Time: 9/21/2022 9:38 AM  Performed by: Fred Vail MD  Authorized by: Fred Vail MD   Comparison: compared with previous ECG from 9/30/2021  Similar to previous ECG  Rhythm: sinus bradycardia  Conduction: right bundle branch block              ICD-10-CM ICD-9-CM   1. Paroxysmal SVT (supraventricular tachycardia) (HCC)  I47.1 427.0   2. Coronary artery disease involving native coronary artery of native heart without angina pectoris  I25.10 414.01   3. Essential hypertension  I10 401.9   4. Dyslipidemia  E78.5 272.4   5. Palpitations  R00.2 785.1   6. Edema of left lower extremity  R60.0 782.3         Assessment/Plan:  1. Coronary artery disease: History of remote PCI.  No chest pain or other anginal symptoms.  Continue aspirin and atorvastatin.      2. Paroxysmal SVT: Short runs of nonsustained SVT seen on Holter monitor from 4/2017 and 9/30/2021.  Symptoms appear benign and self limited.  Continue to monitor.         3.  Essential hypertension: Blood pressure is borderline today.  Continue to avoid any antihypertensive medications given intermittent dizziness.       4.  Dyslipidemia: Good control on atorvastatin based on lipid panel from 3/18/2022.     5.  Palpitations: Runs of nonsustained SVT up to 15 beats on Holter monitor from 9/30/2021 but no symptoms reported.   Symptoms remain mild and self limited.       6.  Left lower extremity swelling: No significant swelling today.  No evidence of DVT on lower extremity duplex from 9/30/2021.  Offer reassurance.

## 2023-03-29 ENCOUNTER — OFFICE VISIT (OUTPATIENT)
Dept: CARDIOLOGY | Facility: CLINIC | Age: 83
End: 2023-03-29
Payer: MEDICARE

## 2023-03-29 VITALS
BODY MASS INDEX: 20.16 KG/M2 | HEIGHT: 65 IN | DIASTOLIC BLOOD PRESSURE: 64 MMHG | HEART RATE: 55 BPM | SYSTOLIC BLOOD PRESSURE: 132 MMHG | OXYGEN SATURATION: 98 % | WEIGHT: 121 LBS

## 2023-03-29 DIAGNOSIS — I25.10 CORONARY ARTERY DISEASE INVOLVING NATIVE CORONARY ARTERY OF NATIVE HEART WITHOUT ANGINA PECTORIS: ICD-10-CM

## 2023-03-29 DIAGNOSIS — I47.1 PAROXYSMAL SVT (SUPRAVENTRICULAR TACHYCARDIA): ICD-10-CM

## 2023-03-29 DIAGNOSIS — R00.2 PALPITATIONS: Primary | ICD-10-CM

## 2023-03-29 DIAGNOSIS — I10 ESSENTIAL HYPERTENSION: ICD-10-CM

## 2023-03-29 DIAGNOSIS — E78.5 DYSLIPIDEMIA: ICD-10-CM

## 2023-03-29 PROCEDURE — 99214 OFFICE O/P EST MOD 30 MIN: CPT | Performed by: INTERNAL MEDICINE

## 2023-03-29 PROCEDURE — 93000 ELECTROCARDIOGRAM COMPLETE: CPT | Performed by: INTERNAL MEDICINE

## 2023-03-29 PROCEDURE — 3078F DIAST BP <80 MM HG: CPT | Performed by: INTERNAL MEDICINE

## 2023-03-29 PROCEDURE — 3075F SYST BP GE 130 - 139MM HG: CPT | Performed by: INTERNAL MEDICINE

## 2023-03-29 NOTE — PROGRESS NOTES
Reason for Visit: Palpitations.    HPI:  Jose Emerson is a 82 y.o. male is here today for follow-up palpitations.  He has noted worsening palpitations and skipped beats recently.  He feels like it is double beating.  Sometimes he feels like it skips beats at times too.  He noticed it while he had his EKG done today which shows sinus bradycardia.  He gets dizzy when he stands up too quickly.  He denies any chest pain, syncope, PND, or orthopnea.  Family who is with him today indicates that he has some anxiety at times.    Previous Cardiac Testing and Procedures:  - Echo (2013) EF 65%, grade 1 diastolic dysfunction, mild AI, normal RV size and function  - Holter monitor (2017) rare ventricular ectopic beats, rare atrial ectopic beats with short runs of nonsustained SVT, no shifting pulse, arrhythmias, or events, no symptoms  - Lower extremity Doppler (10/13/2021) no evidence of DVT  - Holter monitor (2021) rare PACs and PVCs, runs of nonsustained SVT up to 15 beats, no symptoms reported    Lab data:  - Lipid panel (12/10/2018) total cholesterol 126, HDL 62, LDL 56, triglycerides 40  - Lipid panel (2021) total cholesterol 133, HDL 67, LDL 57, triglycerides 45  - Lipid panel (3/18/2022) total cholesterol 149, HDL 68, LDL 69, triglycerides 59    Patient Active Problem List   Diagnosis   • BPH (benign prostatic hyperplasia)   • Coronary artery disease involving native coronary artery of native heart without angina pectoris   • Paroxysmal SVT (supraventricular tachycardia) (HCC)   • Essential hypertension   • Dyslipidemia       Social History     Tobacco Use   • Smoking status: Former     Packs/day: 0.25     Types: Cigarettes     Quit date: 1957     Years since quittin.9   • Smokeless tobacco: Never   • Tobacco comments:     smoked 10-15 years, a pack lasted 2 months   Vaping Use   • Vaping Use: Never used   Substance Use Topics   • Alcohol use: No   • Drug use: No       Family History  "  Problem Relation Age of Onset   • Cancer Father    • Heart disease Father        The following portions of the patient's history were reviewed and updated as appropriate: allergies, current medications, past family history, past medical history, past social history, past surgical history and problem list.      Current Outpatient Medications:   •  aspirin 81 MG chewable tablet, Chew 1 tablet Daily., Disp: , Rfl:   •  atorvastatin (LIPITOR) 40 MG tablet, Take 1 tablet by mouth Every Night., Disp: , Rfl:   •  HYDROcodone-acetaminophen (NORCO) 7.5-325 MG per tablet, Take 1 tablet by mouth Every 8 (Eight) Hours As Needed., Disp: , Rfl:   •  LORazepam (ATIVAN) 0.5 MG tablet, Take 1 tablet by mouth Every 8 (Eight) Hours As Needed for Anxiety., Disp: , Rfl:   •  mirtazapine (REMERON SOL-TAB) 15 MG disintegrating tablet, Place 1 tablet on the tongue Every Night., Disp: , Rfl:   •  nitroglycerin (NITROSTAT) 0.4 MG SL tablet, Place 1 tablet under the tongue Every 5 (Five) Minutes As Needed for Chest Pain. Take no more than 3 doses in 15 minutes., Disp: 25 tablet, Rfl: 11  •  tamsulosin (FLOMAX) 0.4 MG capsule 24 hr capsule, Take 1 capsule by mouth Every Night., Disp: , Rfl:     Review of Systems   Constitutional: Negative for chills and fever.   Cardiovascular: Positive for irregular heartbeat and palpitations. Negative for chest pain and paroxysmal nocturnal dyspnea.   Respiratory: Negative for cough and shortness of breath.    Skin: Negative for rash.   Gastrointestinal: Negative for abdominal pain and heartburn.   Neurological: Positive for dizziness. Negative for numbness.   Psychiatric/Behavioral: The patient is nervous/anxious.        Objective   /64 (BP Location: Left arm, Patient Position: Sitting, Cuff Size: Adult)   Pulse 55   Ht 165.1 cm (65\")   Wt 54.9 kg (121 lb)   SpO2 98%   BMI 20.14 kg/m²   Constitutional:       Appearance: Well-developed.   HENT:      Head: Normocephalic and atraumatic. "   Pulmonary:      Effort: Pulmonary effort is normal.      Breath sounds: Normal breath sounds.   Cardiovascular:      Bradycardia present. Regular rhythm.      Murmurs: There is no murmur.      No gallop. No click.   Skin:     General: Skin is warm and dry.   Neurological:      Mental Status: Alert and oriented to person, place, and time.         ECG 12 Lead    Date/Time: 3/29/2023 2:12 PM  Performed by: Fred Vail MD  Authorized by: Fred Vail MD   Comparison: compared with previous ECG from 9/21/2022  Similar to previous ECG  Rhythm: sinus rhythm  Rate: normal  Conduction: right bundle branch block and left anterior fascicular block              ICD-10-CM ICD-9-CM   1. Palpitations  R00.2 785.1   2. Coronary artery disease involving native coronary artery of native heart without angina pectoris  I25.10 414.01   3. Paroxysmal SVT (supraventricular tachycardia) (HCC)  I47.1 427.0   4. Essential hypertension  I10 401.9   5. Dyslipidemia  E78.5 272.4         Assessment/Plan:  1.  Palpitations: Intermittent skipped beats at home.  He is having palpitations as well as EKG was done today which showed sinus bradycardia.  Anxiety may be contributing to his symptoms.  There are no recent tachycardic episodes.  Overall symptoms appear relatively self-limited.  Continue to monitor for now.    2.  Coronary artery disease: History of remote PCI.  He denies any anginal symptoms.  Continue aspirin and atorvastatin.      3. Paroxysmal SVT: Short runs of nonsustained SVT seen on Holter monitors on 4/2017 and 9/2021.  Continue to monitor.         4.  Essential hypertension: Blood pressure is well controlled today without any antihypertensive medications.  Continue to monitor.      5.  Dyslipidemia: Lipid panel from 3/18/2022 showed good control on atorvastatin.

## 2023-09-20 ENCOUNTER — OFFICE VISIT (OUTPATIENT)
Dept: CARDIOLOGY | Facility: CLINIC | Age: 83
End: 2023-09-20
Payer: MEDICARE

## 2023-09-20 VITALS
BODY MASS INDEX: 19.83 KG/M2 | SYSTOLIC BLOOD PRESSURE: 126 MMHG | HEART RATE: 83 BPM | OXYGEN SATURATION: 95 % | WEIGHT: 119 LBS | DIASTOLIC BLOOD PRESSURE: 62 MMHG | HEIGHT: 65 IN

## 2023-09-20 DIAGNOSIS — I47.1 PAROXYSMAL SVT (SUPRAVENTRICULAR TACHYCARDIA): ICD-10-CM

## 2023-09-20 DIAGNOSIS — E78.5 DYSLIPIDEMIA: ICD-10-CM

## 2023-09-20 DIAGNOSIS — I25.10 CORONARY ARTERY DISEASE INVOLVING NATIVE CORONARY ARTERY OF NATIVE HEART WITHOUT ANGINA PECTORIS: Primary | ICD-10-CM

## 2023-09-20 DIAGNOSIS — I10 ESSENTIAL HYPERTENSION: ICD-10-CM

## 2023-09-20 PROCEDURE — 99214 OFFICE O/P EST MOD 30 MIN: CPT | Performed by: INTERNAL MEDICINE

## 2023-09-20 PROCEDURE — 3078F DIAST BP <80 MM HG: CPT | Performed by: INTERNAL MEDICINE

## 2023-09-20 PROCEDURE — 3074F SYST BP LT 130 MM HG: CPT | Performed by: INTERNAL MEDICINE

## 2023-09-20 NOTE — LETTER
September 20, 2023     Brinda Moreira MD  [address] [facility] [address]    Patient: Jose Emerson   YOB: 1940   Date of Visit: 9/20/2023       Dear Brinda Moreira MD    Jose Emerson was in my office today. Below is a copy of my note.    If you have questions, please do not hesitate to call me. I look forward to following Jose along with you.         Sincerely,        Fred Vail MD        CC: No Recipients      Reason for Visit: cardiovascular follow up.    HPI:  Jose Emerson is a 83 y.o. male is here today for 6-month follow-up.  He fell about 4 months ago.  He fractured his femur and required surgery.  He is doing well from a cardiac standpoint.  He denies any chest pain, palpitations, dizziness, syncope, PND, or orthopnea.  He notices his heart beating a little faster when he is up doing things.  He has not had to take any nitroglycerin.      Previous Cardiac Testing and Procedures:  - Echo (03/11/2013) EF 65%, grade 1 diastolic dysfunction, mild AI, normal RV size and function  - Holter monitor (04/25/2017) rare ventricular ectopic beats, rare atrial ectopic beats with short runs of nonsustained SVT, no shifting pulse, arrhythmias, or events, no symptoms  - Lower extremity Doppler (10/13/2021) no evidence of DVT  - Holter monitor (9/30/2021) rare PACs and PVCs, runs of nonsustained SVT up to 15 beats, no symptoms reported     Lab data:  - Lipid panel (12/10/2018) total cholesterol 126, HDL 62, LDL 56, triglycerides 40  - Lipid panel (9/20/2021) total cholesterol 133, HDL 67, LDL 57, triglycerides 45  - Lipid panel (3/18/2022) total cholesterol 149, HDL 68, LDL 69, triglycerides 59    Patient Active Problem List   Diagnosis   • BPH (benign prostatic hyperplasia)   • Coronary artery disease involving native coronary artery of native heart without angina pectoris   • Paroxysmal SVT (supraventricular tachycardia)   • Essential hypertension   • Dyslipidemia       Social History      Tobacco Use   • Smoking status: Former     Packs/day: 0.25     Types: Cigarettes     Quit date: 1957     Years since quittin.4   • Smokeless tobacco: Never   • Tobacco comments:     smoked 10-15 years, a pack lasted 2 months   Vaping Use   • Vaping Use: Never used   Substance Use Topics   • Alcohol use: No   • Drug use: No       Family History   Problem Relation Age of Onset   • Cancer Father    • Heart disease Father        The following portions of the patient's history were reviewed and updated as appropriate: allergies, current medications, past family history, past medical history, past social history, past surgical history, and problem list.      Current Outpatient Medications:   •  aspirin 81 MG chewable tablet, Chew 1 tablet Daily., Disp: , Rfl:   •  atorvastatin (LIPITOR) 40 MG tablet, Take 1 tablet by mouth Every Night., Disp: , Rfl:   •  HYDROcodone-acetaminophen (NORCO) 7.5-325 MG per tablet, Take 1 tablet by mouth Every 8 (Eight) Hours As Needed., Disp: , Rfl:   •  LORazepam (ATIVAN) 0.5 MG tablet, Take 1 tablet by mouth Every 8 (Eight) Hours As Needed for Anxiety., Disp: , Rfl:   •  mirtazapine (REMERON SOL-TAB) 15 MG disintegrating tablet, Place 1 tablet on the tongue Every Night., Disp: , Rfl:   •  nitroglycerin (NITROSTAT) 0.4 MG SL tablet, Place 1 tablet under the tongue Every 5 (Five) Minutes As Needed for Chest Pain. Take no more than 3 doses in 15 minutes., Disp: 25 tablet, Rfl: 11  •  tamsulosin (FLOMAX) 0.4 MG capsule 24 hr capsule, Take 1 capsule by mouth Every Night., Disp: , Rfl:     Review of Systems   Constitutional: Negative for chills and fever.   HENT:  Positive for hearing loss.    Cardiovascular:  Negative for chest pain and paroxysmal nocturnal dyspnea.   Respiratory:  Negative for cough and shortness of breath.    Skin:  Negative for rash.   Gastrointestinal:  Negative for abdominal pain and heartburn.   Neurological:  Negative for dizziness and numbness.  "    Objective  /62 (BP Location: Left arm, Patient Position: Sitting, Cuff Size: Adult)   Pulse 83   Ht 165.1 cm (65\")   Wt 54 kg (119 lb)   SpO2 95%   BMI 19.80 kg/m²   Constitutional:       Appearance: Well-developed.   HENT:      Head: Normocephalic and atraumatic.      Right Ear: Decreased hearing noted.      Left Ear: Decreased hearing noted.   Pulmonary:      Effort: Pulmonary effort is normal.      Breath sounds: Normal breath sounds.   Cardiovascular:      Normal rate. Regular rhythm.   Edema:     Peripheral edema present.     Ankle: bilateral trace edema of the ankle.  Skin:     General: Skin is warm and dry.   Neurological:      Mental Status: Alert and oriented to person, place, and time.     Procedures    No diagnosis found.      Assessment/Plan:  1.  Coronary artery disease: History of remote PCI.  He remains chest pain free.  Continue aspirin, atorvastatin, and nitroglycerin PRN.      2. Paroxysmal SVT: Short runs of nonsustained SVT seen on previous Holter monitors on 4/2017 and 9/2021.  Only mild tachycardia when he is up moving.           3.  Essential hypertension: Blood pressure is normal today.  No requiring any antihypertensive medications.     4.  Dyslipidemia: Continue atorvastatin.  "

## 2023-09-20 NOTE — PROGRESS NOTES
Reason for Visit: cardiovascular follow up.    HPI:  Jose Emerson is a 83 y.o. male is here today for 6-month follow-up.  He fell about 4 months ago.  He fractured his femur and required surgery.  He is doing well from a cardiac standpoint.  He denies any chest pain, palpitations, dizziness, syncope, PND, or orthopnea.  He notices his heart beating a little faster when he is up doing things.  He has not had to take any nitroglycerin.      Previous Cardiac Testing and Procedures:  - Echo (2013) EF 65%, grade 1 diastolic dysfunction, mild AI, normal RV size and function  - Holter monitor (2017) rare ventricular ectopic beats, rare atrial ectopic beats with short runs of nonsustained SVT, no shifting pulse, arrhythmias, or events, no symptoms  - Lower extremity Doppler (10/13/2021) no evidence of DVT  - Holter monitor (2021) rare PACs and PVCs, runs of nonsustained SVT up to 15 beats, no symptoms reported     Lab data:  - Lipid panel (12/10/2018) total cholesterol 126, HDL 62, LDL 56, triglycerides 40  - Lipid panel (2021) total cholesterol 133, HDL 67, LDL 57, triglycerides 45  - Lipid panel (3/18/2022) total cholesterol 149, HDL 68, LDL 69, triglycerides 59    Patient Active Problem List   Diagnosis    BPH (benign prostatic hyperplasia)    Coronary artery disease involving native coronary artery of native heart without angina pectoris    Paroxysmal SVT (supraventricular tachycardia)    Essential hypertension    Dyslipidemia       Social History     Tobacco Use    Smoking status: Former     Packs/day: 0.25     Types: Cigarettes     Quit date: 1957     Years since quittin.4    Smokeless tobacco: Never    Tobacco comments:     smoked 10-15 years, a pack lasted 2 months   Vaping Use    Vaping Use: Never used   Substance Use Topics    Alcohol use: No    Drug use: No       Family History   Problem Relation Age of Onset    Cancer Father     Heart disease Father        The following  "portions of the patient's history were reviewed and updated as appropriate: allergies, current medications, past family history, past medical history, past social history, past surgical history, and problem list.      Current Outpatient Medications:     aspirin 81 MG chewable tablet, Chew 1 tablet Daily., Disp: , Rfl:     atorvastatin (LIPITOR) 40 MG tablet, Take 1 tablet by mouth Every Night., Disp: , Rfl:     HYDROcodone-acetaminophen (NORCO) 7.5-325 MG per tablet, Take 1 tablet by mouth Every 8 (Eight) Hours As Needed., Disp: , Rfl:     LORazepam (ATIVAN) 0.5 MG tablet, Take 1 tablet by mouth Every 8 (Eight) Hours As Needed for Anxiety., Disp: , Rfl:     mirtazapine (REMERON SOL-TAB) 15 MG disintegrating tablet, Place 1 tablet on the tongue Every Night., Disp: , Rfl:     nitroglycerin (NITROSTAT) 0.4 MG SL tablet, Place 1 tablet under the tongue Every 5 (Five) Minutes As Needed for Chest Pain. Take no more than 3 doses in 15 minutes., Disp: 25 tablet, Rfl: 11    tamsulosin (FLOMAX) 0.4 MG capsule 24 hr capsule, Take 1 capsule by mouth Every Night., Disp: , Rfl:     Review of Systems   Constitutional: Negative for chills and fever.   HENT:  Positive for hearing loss.    Cardiovascular:  Negative for chest pain and paroxysmal nocturnal dyspnea.   Respiratory:  Negative for cough and shortness of breath.    Skin:  Negative for rash.   Gastrointestinal:  Negative for abdominal pain and heartburn.   Neurological:  Negative for dizziness and numbness.     Objective   /62 (BP Location: Left arm, Patient Position: Sitting, Cuff Size: Adult)   Pulse 83   Ht 165.1 cm (65\")   Wt 54 kg (119 lb)   SpO2 95%   BMI 19.80 kg/m²   Constitutional:       Appearance: Well-developed.   HENT:      Head: Normocephalic and atraumatic.      Right Ear: Decreased hearing noted.      Left Ear: Decreased hearing noted.   Pulmonary:      Effort: Pulmonary effort is normal.      Breath sounds: Normal breath sounds.   Cardiovascular: "      Normal rate. Regular rhythm.   Edema:     Peripheral edema present.     Ankle: bilateral trace edema of the ankle.  Skin:     General: Skin is warm and dry.   Neurological:      Mental Status: Alert and oriented to person, place, and time.     Procedures      ICD-10-CM ICD-9-CM   1. Coronary artery disease involving native coronary artery of native heart without angina pectoris  I25.10 414.01   2. Paroxysmal SVT (supraventricular tachycardia)  I47.1 427.0   3. Essential hypertension  I10 401.9   4. Dyslipidemia  E78.5 272.4         Assessment/Plan:  1.  Coronary artery disease: History of remote PCI.  He remains chest pain free.  Continue aspirin, atorvastatin, and nitroglycerin PRN.      2. Paroxysmal SVT: Short runs of nonsustained SVT seen on previous Holter monitors on 4/2017 and 9/2021.  Only mild tachycardia when he is up moving.         3.  Essential hypertension: Blood pressure is normal today.  No requiring any antihypertensive medications.     4.  Dyslipidemia: Continue atorvastatin.

## 2024-07-18 NOTE — PROGRESS NOTES
Subjective    Mr. Emerson is 84 y.o. male    Chief Complaint: Penile Lesion     History of Present Illness  Patient is an 84-year-old gentleman who presents with a lesion on his penis and some swelling as well as drainage.  Patient states he has had it almost his whole life it is always been smaller and palpable in the underside of the distal penis near the glans.  However it has become more swollen he describes the scant drainage is yellow clear.  No tenderness or pain.  Also generalized swelling of the rest of the foreskin.  Patient is not circumcised.  He went to the emergency department was given antibiotics and Dr. Jin was contacted by the emergency department although he did not come in to see the patient and he just told them to continue antibiotics and follow-up here as an outpatient.  Patient states it is some better.    The following portions of the patient's history were reviewed and updated as appropriate: allergies, current medications, past family history, past medical history, past social history, past surgical history and problem list.    Review of Systems   Genitourinary:  Positive for penile swelling.         Current Outpatient Medications:     aspirin 81 MG chewable tablet, Chew 1 tablet Daily., Disp: , Rfl:     atorvastatin (LIPITOR) 40 MG tablet, Take 1 tablet by mouth Every Night., Disp: , Rfl:     ciprofloxacin (Cipro) 250 MG tablet, Take 1 tablet by mouth 2 (Two) Times a Day., Disp: 20 tablet, Rfl: 0    clindamycin (CLEOCIN) 300 MG capsule, Take 1 capsule by mouth 3 (Three) Times a Day., Disp: 30 capsule, Rfl: 0    HYDROcodone-acetaminophen (NORCO) 7.5-325 MG per tablet, Take 1 tablet by mouth Every 8 (Eight) Hours As Needed., Disp: , Rfl:     LORazepam (ATIVAN) 0.5 MG tablet, Take 1 tablet by mouth Every 8 (Eight) Hours As Needed for Anxiety., Disp: , Rfl:     mirtazapine (REMERON SOL-TAB) 15 MG disintegrating tablet, Place 1 tablet on the tongue Every Night., Disp: , Rfl:      "mupirocin (BACTROBAN) 2 % ointment, Apply 1 Application topically to the appropriate area as directed 3 (Three) Times a Day., Disp: 1 g, Rfl: 0    nitroglycerin (NITROSTAT) 0.4 MG SL tablet, Place 1 tablet under the tongue Every 5 (Five) Minutes As Needed for Chest Pain. Take no more than 3 doses in 15 minutes., Disp: 25 tablet, Rfl: 11    saccharomyces boulardii (FLORASTOR) 250 MG capsule, Take 1 capsule by mouth 2 (Two) Times a Day for 10 days., Disp: 20 capsule, Rfl: 0    tamsulosin (FLOMAX) 0.4 MG capsule 24 hr capsule, Take 1 capsule by mouth Every Night., Disp: , Rfl:     Past Medical History:   Diagnosis Date    Abdominal pain     Arteriosclerotic cardiovascular disease     CAD (coronary artery disease)     CKD (chronic kidney disease), stage III     Diarrhea     Edema extremities     Hypertension     Myocardial infarct, old     Palpitations     Pure hypercholesterolemia     SVT (supraventricular tachycardia)        Past Surgical History:   Procedure Laterality Date    CATARACT EXTRACTION      CORONARY STENT PLACEMENT         Social History     Socioeconomic History    Marital status:    Tobacco Use    Smoking status: Former     Current packs/day: 0.00     Types: Cigarettes     Quit date: 1957     Years since quittin.3    Smokeless tobacco: Never    Tobacco comments:     smoked 10-15 years, a pack lasted 2 months   Vaping Use    Vaping status: Never Used   Substance and Sexual Activity    Alcohol use: No    Drug use: No    Sexual activity: Not Currently     Partners: Female       Family History   Problem Relation Age of Onset    Cancer Father     Heart disease Father        Objective    Temp 98.2 °F (36.8 °C)   Ht 162.6 cm (64\")   Wt 54.9 kg (121 lb)   BMI 20.77 kg/m²     Physical Exam  Constitutional:       Appearance: Normal appearance.   HENT:      Head: Normocephalic and atraumatic.   Pulmonary:      Effort: Pulmonary effort is normal.   Genitourinary:     Penis: Paraphimosis and " swelling present.       Comments: Foreskin is trapped behind the glans there is generalized swelling of the foreskin and edematous and pendulous tissue on the ventral portion of the penis next to the glans.  There is also a small area with a tiny hole where there is yellow clear drainage.  I reduced the paraphimosis retracted or pulled the foreskin back over the glans.  Skin:     Coloration: Skin is not pale.   Neurological:      Mental Status: He is alert.   Psychiatric:         Mood and Affect: Mood normal.         Behavior: Behavior normal.             Results for orders placed or performed in visit on 08/02/24   POC Urinalysis Dipstick, Multipro    Specimen: Urine   Result Value Ref Range    Color Yellow Yellow, Straw, Dark Yellow, Jessica    Clarity, UA Clear Clear    Glucose, UA Negative Negative mg/dL    Bilirubin Negative Negative    Ketones, UA Negative Negative    Specific Gravity  1.030 1.005 - 1.030    Blood, UA Trace (A) Negative    pH, Urine 5.5 5.0 - 8.0    Protein, POC Trace (A) Negative mg/dL    Urobilinogen, UA 0.2 E.U./dL Normal, 0.2 E.U./dL    Nitrite, UA Negative Negative    Leukocytes Negative Negative     Assessment and Plan    Diagnoses and all orders for this visit:    1. Penile lesion (Primary)  -     POC Urinalysis Dipstick, Multipro    2. Paraphimosis      Patient had small little swollen foreskin as well as a more edematous area in the ventral distal portion of the penis.  This is swelling from paraphimosis I reduced the paraphimosis in the office.  Is an area at the end with a slight hole where there is yellow clear drainage I feel the swelling and symptoms will resolve in time.  He can discontinue the antibiotics until completed.  Can apply ice if needed to affected area.  Follow-up as needed.

## 2024-07-23 ENCOUNTER — APPOINTMENT (OUTPATIENT)
Dept: ULTRASOUND IMAGING | Facility: HOSPITAL | Age: 84
End: 2024-07-23
Payer: MEDICARE

## 2024-07-23 ENCOUNTER — HOSPITAL ENCOUNTER (EMERGENCY)
Facility: HOSPITAL | Age: 84
Discharge: HOME | End: 2024-07-23
Attending: INTERNAL MEDICINE
Payer: MEDICARE

## 2024-07-23 VITALS
RESPIRATION RATE: 16 BRPM | HEIGHT: 64 IN | WEIGHT: 122 LBS | OXYGEN SATURATION: 99 % | TEMPERATURE: 98.5 F | SYSTOLIC BLOOD PRESSURE: 155 MMHG | BODY MASS INDEX: 20.83 KG/M2 | DIASTOLIC BLOOD PRESSURE: 70 MMHG | HEART RATE: 73 BPM

## 2024-07-23 DIAGNOSIS — L02.91 ABSCESS: Primary | ICD-10-CM

## 2024-07-23 DIAGNOSIS — E87.6 HYPOKALEMIA: ICD-10-CM

## 2024-07-23 LAB
ALBUMIN SERPL-MCNC: 3.6 G/DL (ref 3.5–5.2)
ALBUMIN/GLOB SERPL: 1.3 G/DL
ALP SERPL-CCNC: 50 U/L (ref 39–117)
ALT SERPL W P-5'-P-CCNC: 10 U/L (ref 1–41)
ANION GAP SERPL CALCULATED.3IONS-SCNC: 7 MMOL/L (ref 5–15)
AST SERPL-CCNC: 16 U/L (ref 1–40)
BASOPHILS # BLD AUTO: 0.02 10*3/MM3 (ref 0–0.2)
BASOPHILS NFR BLD AUTO: 0.3 % (ref 0–1.5)
BILIRUB SERPL-MCNC: 0.4 MG/DL (ref 0–1.2)
BUN SERPL-MCNC: 16 MG/DL (ref 8–23)
BUN/CREAT SERPL: 19.5 (ref 7–25)
CALCIUM SPEC-SCNC: 8.8 MG/DL (ref 8.6–10.5)
CHLORIDE SERPL-SCNC: 107 MMOL/L (ref 98–107)
CO2 SERPL-SCNC: 33 MMOL/L (ref 22–29)
CREAT SERPL-MCNC: 0.82 MG/DL (ref 0.76–1.27)
DEPRECATED RDW RBC AUTO: 49.1 FL (ref 37–54)
EGFRCR SERPLBLD CKD-EPI 2021: 85.5 ML/MIN/1.73
EOSINOPHIL # BLD AUTO: 0.14 10*3/MM3 (ref 0–0.4)
EOSINOPHIL NFR BLD AUTO: 1.9 % (ref 0.3–6.2)
ERYTHROCYTE [DISTWIDTH] IN BLOOD BY AUTOMATED COUNT: 14 % (ref 12.3–15.4)
GLOBULIN UR ELPH-MCNC: 2.7 GM/DL
GLUCOSE SERPL-MCNC: 104 MG/DL (ref 65–99)
HCT VFR BLD AUTO: 34.8 % (ref 37.5–51)
HGB BLD-MCNC: 11.2 G/DL (ref 13–17.7)
IMM GRANULOCYTES # BLD AUTO: 0.04 10*3/MM3 (ref 0–0.05)
IMM GRANULOCYTES NFR BLD AUTO: 0.6 % (ref 0–0.5)
LYMPHOCYTES # BLD AUTO: 1.18 10*3/MM3 (ref 0.7–3.1)
LYMPHOCYTES NFR BLD AUTO: 16.3 % (ref 19.6–45.3)
MAGNESIUM SERPL-MCNC: 2 MG/DL (ref 1.6–2.4)
MCH RBC QN AUTO: 30.8 PG (ref 26.6–33)
MCHC RBC AUTO-ENTMCNC: 32.2 G/DL (ref 31.5–35.7)
MCV RBC AUTO: 95.6 FL (ref 79–97)
MONOCYTES # BLD AUTO: 0.82 10*3/MM3 (ref 0.1–0.9)
MONOCYTES NFR BLD AUTO: 11.4 % (ref 5–12)
NEUTROPHILS NFR BLD AUTO: 5.02 10*3/MM3 (ref 1.7–7)
NEUTROPHILS NFR BLD AUTO: 69.5 % (ref 42.7–76)
NRBC BLD AUTO-RTO: 0 /100 WBC (ref 0–0.2)
PLATELET # BLD AUTO: 171 10*3/MM3 (ref 140–450)
PMV BLD AUTO: 11.1 FL (ref 6–12)
POTASSIUM SERPL-SCNC: 3 MMOL/L (ref 3.5–5.2)
PROT SERPL-MCNC: 6.3 G/DL (ref 6–8.5)
RBC # BLD AUTO: 3.64 10*6/MM3 (ref 4.14–5.8)
SODIUM SERPL-SCNC: 147 MMOL/L (ref 136–145)
WBC NRBC COR # BLD AUTO: 7.22 10*3/MM3 (ref 3.4–10.8)

## 2024-07-23 PROCEDURE — 80053 COMPREHEN METABOLIC PANEL: CPT | Performed by: INTERNAL MEDICINE

## 2024-07-23 PROCEDURE — 96365 THER/PROPH/DIAG IV INF INIT: CPT

## 2024-07-23 PROCEDURE — 36415 COLL VENOUS BLD VENIPUNCTURE: CPT

## 2024-07-23 PROCEDURE — 85025 COMPLETE CBC W/AUTO DIFF WBC: CPT | Performed by: INTERNAL MEDICINE

## 2024-07-23 PROCEDURE — 25010000002 CLINDAMYCIN 600 MG/50ML SOLUTION: Performed by: INTERNAL MEDICINE

## 2024-07-23 PROCEDURE — 99284 EMERGENCY DEPT VISIT MOD MDM: CPT

## 2024-07-23 PROCEDURE — 76999 ECHO EXAMINATION PROCEDURE: CPT

## 2024-07-23 PROCEDURE — 83735 ASSAY OF MAGNESIUM: CPT | Performed by: INTERNAL MEDICINE

## 2024-07-23 RX ORDER — CIPROFLOXACIN 250 MG/1
250 TABLET, FILM COATED ORAL 2 TIMES DAILY
Qty: 20 TABLET | Refills: 0 | Status: SHIPPED | OUTPATIENT
Start: 2024-07-23

## 2024-07-23 RX ORDER — CLINDAMYCIN HYDROCHLORIDE 300 MG/1
300 CAPSULE ORAL 3 TIMES DAILY
Qty: 30 CAPSULE | Refills: 0 | Status: SHIPPED | OUTPATIENT
Start: 2024-07-23

## 2024-07-23 RX ORDER — CLINDAMYCIN PHOSPHATE 600 MG/50ML
600 INJECTION, SOLUTION INTRAVENOUS ONCE
Status: COMPLETED | OUTPATIENT
Start: 2024-07-23 | End: 2024-07-23

## 2024-07-23 RX ORDER — POTASSIUM CHLORIDE 750 MG/1
40 CAPSULE, EXTENDED RELEASE ORAL ONCE
Status: COMPLETED | OUTPATIENT
Start: 2024-07-23 | End: 2024-07-23

## 2024-07-23 RX ORDER — SACCHAROMYCES BOULARDII 250 MG
250 CAPSULE ORAL 2 TIMES DAILY
Qty: 20 CAPSULE | Refills: 0 | Status: SHIPPED | OUTPATIENT
Start: 2024-07-23 | End: 2024-08-02

## 2024-07-23 RX ADMIN — POTASSIUM CHLORIDE 40 MEQ: 750 CAPSULE, EXTENDED RELEASE ORAL at 15:34

## 2024-07-23 RX ADMIN — CLINDAMYCIN PHOSPHATE 600 MG: 600 INJECTION, SOLUTION INTRAVENOUS at 15:30

## 2024-07-23 NOTE — DISCHARGE INSTRUCTIONS
FU with PCP on Friday. I would like you to have a BMP drawn to recheck your potassium.  Keep your scheduled appointment with urology.   Two antibiotics were called in to your pharmacy today. One medication can be hard on your bowels. I called in a Probiotic to help with this.   I would like the wound to continue to drain. Use antibiotic ointment on the area and a warm cloth nightly to the area.   If SX worsen or fever and chills develop, please return to the ED.

## 2024-07-23 NOTE — ED PROVIDER NOTES
"Subjective   History of Present Illness  84-year-old male presents emergency department with a lesion on his penis.  He states it has been there for about 1 to 2 months.  He states prior to it \"rupturing.\"  It was the size of the 2 of his thumbs put together.  The lesion is on the posterior aspect of his penis by the skin fold line.  He denies any fevers or chills.  He does notice a clear type of drainage from this area.  Fevers or chills.  He does have a fever or chills. He notes a clear type of drainage and states that it had stained his underwear this am. He does have an appointment to see urology on .  The patient denies any STD exposure.  His wife is at bedside and does confirm this.  Patient has no dysuria.    Review of Systems   Constitutional:  Negative for chills and fever.   Skin:  Positive for wound.       Past Medical History:   Diagnosis Date    Abdominal pain     Arteriosclerotic cardiovascular disease     CAD (coronary artery disease)     CKD (chronic kidney disease), stage III     Diarrhea     Edema extremities     Hypertension     Myocardial infarct, old     Palpitations     Pure hypercholesterolemia     SVT (supraventricular tachycardia)        Allergies   Allergen Reactions    Septra [Sulfamethoxazole-Trimethoprim]     Sulfa Antibiotics        Past Surgical History:   Procedure Laterality Date    CATARACT EXTRACTION      CORONARY STENT PLACEMENT         Family History   Problem Relation Age of Onset    Cancer Father     Heart disease Father        Social History     Socioeconomic History    Marital status:    Tobacco Use    Smoking status: Former     Current packs/day: 0.00     Types: Cigarettes     Quit date: 1957     Years since quittin.2    Smokeless tobacco: Never    Tobacco comments:     smoked 10-15 years, a pack lasted 2 months   Vaping Use    Vaping status: Never Used   Substance and Sexual Activity    Alcohol use: No    Drug use: No    Sexual activity: Defer "           Objective   Physical Exam  Vitals reviewed.   Constitutional:       Appearance: Normal appearance.   HENT:      Head: Normocephalic and atraumatic.      Right Ear: External ear normal.      Left Ear: External ear normal.      Nose: Nose normal.   Eyes:      General: No scleral icterus.     Conjunctiva/sclera: Conjunctivae normal.   Cardiovascular:      Rate and Rhythm: Normal rate and regular rhythm.      Heart sounds: Normal heart sounds.   Pulmonary:      Effort: Pulmonary effort is normal.      Breath sounds: Normal breath sounds.   Abdominal:      General: There is no distension.      Palpations: Abdomen is soft.   Musculoskeletal:         General: No tenderness.      Cervical back: Normal range of motion and neck supple.   Skin:     General: Skin is warm and dry.      Findings: Lesion present.      Comments: The patient has a lesion on the posterior aspect of his penis around the skin fold line.  It is swollen.  Not tender to palpation.  It has a circular sized dark area that appears slightly cratered the size of eraser.  On palpation I did not note any drainage from the area.  The skin around the area is erythematous.   Neurological:      Mental Status: He is alert and oriented to person, place, and time.      Cranial Nerves: No cranial nerve deficit.   Psychiatric:         Mood and Affect: Mood normal.         Behavior: Behavior normal.         Procedures       Lab Results (last 24 hours)       Procedure Component Value Units Date/Time    CBC & Differential [[id number]]  (Abnormal) Collected: 07/23/24 1254    Specimen: Blood Updated: 07/23/24 1305    Narrative:      The following orders were created for panel order CBC & Differential.  Procedure                               Abnormality         Status                     ---------                               -----------         ------                     CBC Auto Differential[[id number]]        Abnormal            Final result                 Please  view results for these tests on the individual orders.    Comprehensive Metabolic Panel [[id number]]  (Abnormal) Collected: 07/23/24 1254    Specimen: Blood Updated: 07/23/24 1334     Glucose 104 mg/dL      BUN 16 mg/dL      Creatinine 0.82 mg/dL      Sodium 147 mmol/L      Potassium 3.0 mmol/L      Chloride 107 mmol/L      CO2 33.0 mmol/L      Calcium 8.8 mg/dL      Total Protein 6.3 g/dL      Albumin 3.6 g/dL      ALT (SGPT) 10 U/L      AST (SGOT) 16 U/L      Alkaline Phosphatase 50 U/L      Total Bilirubin 0.4 mg/dL      Globulin 2.7 gm/dL      A/G Ratio 1.3 g/dL      BUN/Creatinine Ratio 19.5     Anion Gap 7.0 mmol/L      eGFR 85.5 mL/min/1.73     Narrative:      GFR Normal >60  Chronic Kidney Disease <60  Kidney Failure <15    The GFR formula is only valid for adults with stable renal function between ages 18 and 70.    CBC Auto Differential [[id number]]  (Abnormal) Collected: 07/23/24 1254    Specimen: Blood Updated: 07/23/24 1305     WBC 7.22 10*3/mm3      RBC 3.64 10*6/mm3      Hemoglobin 11.2 g/dL      Hematocrit 34.8 %      MCV 95.6 fL      MCH 30.8 pg      MCHC 32.2 g/dL      RDW 14.0 %      RDW-SD 49.1 fl      MPV 11.1 fL      Platelets 171 10*3/mm3      Neutrophil % 69.5 %      Lymphocyte % 16.3 %      Monocyte % 11.4 %      Eosinophil % 1.9 %      Basophil % 0.3 %      Immature Grans % 0.6 %      Neutrophils, Absolute 5.02 10*3/mm3      Lymphocytes, Absolute 1.18 10*3/mm3      Monocytes, Absolute 0.82 10*3/mm3      Eosinophils, Absolute 0.14 10*3/mm3      Basophils, Absolute 0.02 10*3/mm3      Immature Grans, Absolute 0.04 10*3/mm3      nRBC 0.0 /100 WBC     Magnesium [[id number]]  (Normal) Collected: 07/23/24 1254    Specimen: Blood Updated: 07/23/24 1527     Magnesium 2.0 mg/dL           US Soft Tissue   Final Result       Targeted soft tissue ultrasound of the penile soft tissues was   performed. At the site of concern, along the ventral penile shaft, there   is a 1.9 x 0.9 x 1.3 cm heterogeneous fluid  collection with surrounding   soft tissue thickening and hyperemia. Imaging appearance is most   suggestive of a small soft tissue abscess.       This report was signed and finalized on 7/23/2024 1:32 PM by Dr. Reinier Menezes MD.                ED Course  ED Course as of 07/23/24 1550   Tue Jul 23, 2024   1515 Spoke with Dr. Jin and he is agreeable with [facility] home. Cipro and Clinda coverage. Keep OP follow up. Antibiotic ointment.  [AJ]      ED Course User Index  [AJ] Sondra Alcaraz DO                                             Medical Decision Making  Differential diagnosis includes: Skin cancer, abscess, cellulitis    Problems Addressed:  Abscess: complicated acute illness or injury  Hypokalemia: complicated acute illness or injury    Amount and/or Complexity of Data Reviewed  Labs: ordered.     Details: CBC CMP  Radiology: ordered.     Details: Ultrasound    Risk  OTC drugs.  Prescription drug management.        Final diagnoses:   Abscess   Hypokalemia       ED Disposition  ED Disposition       ED Disposition   Discharge    Condition   Stable    Comment   --               Brinda Moreira MD  [address] [facility] [address]  [phone]          Urology  Keep appointment scheduled for August 2nd.             Medication List        New Prescriptions      ciprofloxacin 250 MG tablet  Commonly known as: Cipro  Take 1 tablet by mouth 2 (Two) Times a Day.     clindamycin 300 MG capsule  Commonly known as: CLEOCIN  Take 1 capsule by mouth 3 (Three) Times a Day.     mupirocin 2 % ointment  Commonly known as: BACTROBAN  Apply 1 Application topically to the appropriate area as directed 3 (Three) Times a Day.     saccharomyces boulardii 250 MG capsule  Commonly known as: FLORASTOR  Take 1 capsule by mouth 2 (Two) Times a Day for 10 days.               Where to Get Your Medications        These medications were sent to [facility] [address] [place] [address] - [address] AT [facility]  & [facility] [phone] PH  - [phone] FX  [address] [place] [address]      Phone: [phone]   ciprofloxacin 250 MG tablet  clindamycin 300 MG capsule  mupirocin 2 % ointment  saccharomyces boulardii 250 MG capsule            Sondra Alcaraz,   07/23/24 1315       Sondra Alcaraz,   07/23/24 5015

## 2024-08-02 ENCOUNTER — OFFICE VISIT (OUTPATIENT)
Dept: UROLOGY | Facility: CLINIC | Age: 84
End: 2024-08-02
Payer: MEDICARE

## 2024-08-02 VITALS — TEMPERATURE: 98.2 F | BODY MASS INDEX: 20.66 KG/M2 | WEIGHT: 121 LBS | HEIGHT: 64 IN

## 2024-08-02 DIAGNOSIS — N48.9 PENILE LESION: Primary | ICD-10-CM

## 2024-08-02 DIAGNOSIS — N47.2 PARAPHIMOSIS: ICD-10-CM

## 2024-08-02 LAB
BILIRUB BLD-MCNC: NEGATIVE MG/DL
CLARITY, POC: CLEAR
COLOR UR: YELLOW
GLUCOSE UR STRIP-MCNC: NEGATIVE MG/DL
KETONES UR QL: NEGATIVE
LEUKOCYTE EST, POC: NEGATIVE
NITRITE UR-MCNC: NEGATIVE MG/ML
PH UR: 5.5 [PH] (ref 5–8)
PROT UR STRIP-MCNC: ABNORMAL MG/DL
RBC # UR STRIP: ABNORMAL /UL
SP GR UR: 1.03 (ref 1–1.03)
UROBILINOGEN UR QL: ABNORMAL

## 2024-09-18 ENCOUNTER — OFFICE VISIT (OUTPATIENT)
Dept: CARDIOLOGY | Facility: CLINIC | Age: 84
End: 2024-09-18
Payer: MEDICARE

## 2024-09-18 VITALS
BODY MASS INDEX: 20.14 KG/M2 | OXYGEN SATURATION: 98 % | SYSTOLIC BLOOD PRESSURE: 138 MMHG | HEART RATE: 55 BPM | WEIGHT: 118 LBS | DIASTOLIC BLOOD PRESSURE: 72 MMHG | HEIGHT: 64 IN

## 2024-09-18 DIAGNOSIS — E78.5 DYSLIPIDEMIA: ICD-10-CM

## 2024-09-18 DIAGNOSIS — I25.10 CORONARY ARTERY DISEASE INVOLVING NATIVE CORONARY ARTERY OF NATIVE HEART WITHOUT ANGINA PECTORIS: Primary | ICD-10-CM

## 2024-09-18 DIAGNOSIS — I47.10 PAROXYSMAL SVT (SUPRAVENTRICULAR TACHYCARDIA): ICD-10-CM

## 2024-09-18 DIAGNOSIS — I10 ESSENTIAL HYPERTENSION: ICD-10-CM

## 2024-09-18 PROCEDURE — 1159F MED LIST DOCD IN RCRD: CPT | Performed by: INTERNAL MEDICINE

## 2024-09-18 PROCEDURE — 1160F RVW MEDS BY RX/DR IN RCRD: CPT | Performed by: INTERNAL MEDICINE

## 2024-09-18 PROCEDURE — 99214 OFFICE O/P EST MOD 30 MIN: CPT | Performed by: INTERNAL MEDICINE
